# Patient Record
Sex: FEMALE | Race: WHITE | NOT HISPANIC OR LATINO | Employment: FULL TIME | URBAN - METROPOLITAN AREA
[De-identification: names, ages, dates, MRNs, and addresses within clinical notes are randomized per-mention and may not be internally consistent; named-entity substitution may affect disease eponyms.]

---

## 2019-10-03 ENCOUNTER — HOSPITAL ENCOUNTER (OUTPATIENT)
Dept: RADIOLOGY | Facility: MEDICAL CENTER | Age: 19
End: 2019-10-03
Attending: PHYSICIAN ASSISTANT
Payer: COMMERCIAL

## 2019-10-03 DIAGNOSIS — S52.572A OTHER INTRAARTICULAR FRACTURE OF LOWER END OF LEFT RADIUS, INITIAL ENCOUNTER FOR CLOSED FRACTURE: ICD-10-CM

## 2019-10-03 PROCEDURE — 73200 CT UPPER EXTREMITY W/O DYE: CPT | Mod: LT

## 2020-02-20 ENCOUNTER — OFFICE VISIT (OUTPATIENT)
Dept: URGENT CARE | Facility: CLINIC | Age: 20
End: 2020-02-20
Payer: COMMERCIAL

## 2020-02-20 VITALS
BODY MASS INDEX: 23.05 KG/M2 | TEMPERATURE: 98.6 F | SYSTOLIC BLOOD PRESSURE: 110 MMHG | HEART RATE: 80 BPM | RESPIRATION RATE: 17 BRPM | WEIGHT: 135 LBS | DIASTOLIC BLOOD PRESSURE: 68 MMHG | OXYGEN SATURATION: 100 % | HEIGHT: 64 IN

## 2020-02-20 DIAGNOSIS — J06.9 URI WITH COUGH AND CONGESTION: ICD-10-CM

## 2020-02-20 DIAGNOSIS — J40 BRONCHITIS: Primary | ICD-10-CM

## 2020-02-20 PROCEDURE — 99214 OFFICE O/P EST MOD 30 MIN: CPT | Performed by: PHYSICIAN ASSISTANT

## 2020-02-20 RX ORDER — DOXYCYCLINE HYCLATE 100 MG
100 TABLET ORAL 2 TIMES DAILY
Qty: 14 TAB | Refills: 0 | Status: SHIPPED | OUTPATIENT
Start: 2020-02-20

## 2020-02-20 RX ORDER — PROMETHAZINE HYDROCHLORIDE AND CODEINE PHOSPHATE 6.25; 1 MG/5ML; MG/5ML
5 SYRUP ORAL 4 TIMES DAILY PRN
Qty: 120 ML | Refills: 0 | Status: SHIPPED | OUTPATIENT
Start: 2020-02-20 | End: 2020-02-27

## 2020-02-20 RX ORDER — METHYLPREDNISOLONE 4 MG/1
TABLET ORAL
Qty: 21 TAB | Refills: 0 | Status: SHIPPED | OUTPATIENT
Start: 2020-02-20

## 2020-02-21 NOTE — PATIENT INSTRUCTIONS
Acute Bronchitis, Adult  Acute bronchitis is when air tubes (bronchi) in the lungs suddenly get swollen. The condition can make it hard to breathe. It can also cause these symptoms:  · A cough.  · Coughing up clear, yellow, or green mucus.  · Wheezing.  · Chest congestion.  · Shortness of breath.  · A fever.  · Body aches.  · Chills.  · A sore throat.  Follow these instructions at home:  Medicines  · Take over-the-counter and prescription medicines only as told by your doctor.  · If you were prescribed an antibiotic medicine, take it as told by your doctor. Do not stop taking the antibiotic even if you start to feel better.  General instructions  · Rest.  · Drink enough fluids to keep your pee (urine) clear or pale yellow.  · Avoid smoking and secondhand smoke. If you smoke and you need help quitting, ask your doctor. Quitting will help your lungs heal faster.  · Use an inhaler, cool mist vaporizer, or humidifier as told by your doctor.  · Keep all follow-up visits as told by your doctor. This is important.  How is this prevented?  To lower your risk of getting this condition again:  · Wash your hands often with soap and water. If you cannot use soap and water, use hand .  · Avoid contact with people who have cold symptoms.  · Try not to touch your hands to your mouth, nose, or eyes.  · Make sure to get the flu shot every year.  Contact a doctor if:  · Your symptoms do not get better in 2 weeks.  Get help right away if:  · You cough up blood.  · You have chest pain.  · You have very bad shortness of breath.  · You become dehydrated.  · You faint (pass out) or keep feeling like you are going to pass out.  · You keep throwing up (vomiting).  · You have a very bad headache.  · Your fever or chills gets worse.  This information is not intended to replace advice given to you by your health care provider. Make sure you discuss any questions you have with your health care provider.  Document Released: 06/05/2009  Document Revised: 07/26/2017 Document Reviewed: 06/07/2017  [employer] Interactive Patient Education © 2017 [employer]

## 2020-02-21 NOTE — PROGRESS NOTES
Subjective:      Pt is a 19 y.o. female who presents with Cough (dry cough , chest congestion , SOB , wheezing x 4 days )            HPI  This is a new problem. PT presents to  clinic today complaining of sore throat,  pressure in ears, cough, fatigue, runny nose, wheezing and SOB. PT denies CP, NVD, abdominal pain, joint pain. PT states these symptoms began around 4-5 days ago. PT states the pain is a 7/10 with coughing fits, aching in nature and worse at night. Pt has not taken any RX medications for this condition. The pt's medication list, problem list, and allergies have been evaluated and reviewed during today's visit.    PMH:  Past Medical History:   Diagnosis Date   • History of kidney disease 10/10/2016       PSH:  Past Surgical History:   Procedure Laterality Date   • ABDOMINAL EXPLORATION         Fam Hx:    family history includes Arthritis in her maternal grandmother; Cancer in her paternal grandfather.  Family Status   Relation Name Status   • Mo  Alive   • Fa  Alive   • Sis  Alive   • Bro     • MAunt  Alive   • MUnc  Alive   • PAunt  Alive   • PUnc  Alive   • MGMo  Alive   • MGFa  Alive   • PGMo  Alive   • PGFa  Alive       Soc HX:  Single, nonsmoker    Medications:    Current Outpatient Medications:   •  doxycycline (VIBRAMYCIN) 100 MG Tab, Take 1 Tab by mouth 2 times a day., Disp: 14 Tab, Rfl: 0  •  methylPREDNISolone (MEDROL DOSEPAK) 4 MG Tablet Therapy Pack, Follow schedule on package instructions., Disp: 21 Tab, Rfl: 0  •  promethazine-codeine (PHENERGAN-CODEINE) 6.25-10 MG/5ML Syrup, Take 5 mL by mouth 4 times a day as needed for Cough for up to 7 days., Disp: 120 mL, Rfl: 0      Allergies:  Sulfa drugs    ROS  Review of Systems   Constitutional: Positive for malaise/fatigue. Negative for fever and diaphoresis.   HENT: Positive for congestion and sore throat. Negative for ear discharge, hearing loss, nosebleeds and tinnitus.    Eyes: Negative for blurred vision, double vision and  "photophobia.   Respiratory: Positive for cough, sputum production, shortness of breath and wheezing. Negative for hemoptysis.    Cardiovascular: Negative for chest pain and palpitations.   Gastrointestinal: Negative for nausea, vomiting, abdominal pain, diarrhea and constipation.   Genitourinary: Negative for dysuria and flank pain.   Musculoskeletal: Negative for joint pain and myalgias.   Skin: Negative for itching and rash.   Neurological:  Negative for dizziness, tingling and weakness.   Endo/Heme/Allergies: Does not bruise/bleed easily.   Psychiatric/Behavioral: Negative for depression. The patient is not nervous/anxious.             Objective:     /68 (BP Location: Left arm, Patient Position: Sitting, BP Cuff Size: Adult)   Pulse 80   Temp 37 °C (98.6 °F) (Temporal)   Resp 17   Ht 1.626 m (5' 4\")   Wt 61.2 kg (135 lb)   SpO2 100%   BMI 23.17 kg/m²      Physical Exam       Physical Exam   Constitutional: PT is oriented to person, place, and time. PT appears well-developed and well-nourished. No distress.   HENT:   Head: Normocephalic and atraumatic.   Right Ear: Hearing, tympanic membrane, external ear and ear canal normal.   Left Ear: Hearing, tympanic membrane, external ear and ear canal normal.   Nose: Mucosal edema, rhinorrhea and sinus tenderness present. Right sinus exhibits frontal sinus tenderness. Left sinus exhibits frontal sinus tenderness.   Mouth/Throat: Uvula is midline. Mucous membranes are pale. Posterior oropharyngeal edema and posterior oropharyngeal erythema present. No oropharyngeal exudate.   Eyes: Conjunctivae normal and EOM are normal. Pupils are equal, round, and reactive to light. Right eye exhibits no discharge. Left eye exhibits no discharge.   Neck: Normal range of motion. Neck supple. No thyromegaly present.   Cardiovascular: Normal rate, regular rhythm, normal heart sounds and intact distal pulses.  Exam reveals no gallop and no friction rub.    No murmur " heard.  Pulmonary/Chest: Effort normal. No respiratory distress. PT has wheezes. PT has no rales. PT exhibits tenderness.   Abdominal: Soft. Bowel sounds are normal. PT exhibits no distension and no mass. There is no tenderness. There is no rebound and no guarding.   Musculoskeletal: Normal range of motion. PT exhibits no edema and no tenderness.   Lymphadenopathy:     PT has no cervical adenopathy.   Neurological: Pt is alert and oriented to person, place, and time. Pt has normal reflexes. No cranial nerve deficit.   Skin: Skin is warm and dry. No rash noted. No erythema.   Psychiatric: PT has a normal mood and affect. Pt behavior is normal. Judgment and thought content normal.        Assessment/Plan:       1. Bronchitis    - doxycycline (VIBRAMYCIN) 100 MG Tab; Take 1 Tab by mouth 2 times a day.  Dispense: 14 Tab; Refill: 0  - methylPREDNISolone (MEDROL DOSEPAK) 4 MG Tablet Therapy Pack; Follow schedule on package instructions.  Dispense: 21 Tab; Refill: 0    2. URI with cough and congestion    - methylPREDNISolone (MEDROL DOSEPAK) 4 MG Tablet Therapy Pack; Follow schedule on package instructions.  Dispense: 21 Tab; Refill: 0  - promethazine-codeine (PHENERGAN-CODEINE) 6.25-10 MG/5ML Syrup; Take 5 mL by mouth 4 times a day as needed for Cough for up to 7 days.  Dispense: 120 mL; Refill: 0      Concern for worsening symptoms of URI which shortly could transition to pneumonia and worsening sinus congestion and infection with powerful cough keeping pt up at night as they must sleep upright to avoid coughing fits.  Diff DX: Bronchitis, Sinusitis, Pneumonia, Influenza, Viral URI, Allergies  Rest, fluids encouraged.  OTC decongestant for congestion/cough  AVS with medical info given.  Pt was in full understanding and agreement with the plan.  Differential diagnosis, natural history, supportive care, and indications for immediate follow-up discussed. All questions answered. Patient agrees with the plan of  care.  Follow-up as needed if symptoms worsen or fail to improve to PCP, Urgent care or Emergency Room.

## 2021-09-16 ENCOUNTER — HOSPITAL ENCOUNTER (EMERGENCY)
Facility: MEDICAL CENTER | Age: 21
End: 2021-09-16
Attending: EMERGENCY MEDICINE
Payer: COMMERCIAL

## 2021-09-16 VITALS
RESPIRATION RATE: 16 BRPM | SYSTOLIC BLOOD PRESSURE: 114 MMHG | BODY MASS INDEX: 21.93 KG/M2 | DIASTOLIC BLOOD PRESSURE: 72 MMHG | TEMPERATURE: 97.6 F | HEIGHT: 65 IN | WEIGHT: 131.61 LBS | HEART RATE: 79 BPM | OXYGEN SATURATION: 89 %

## 2021-09-16 DIAGNOSIS — R10.9 RIGHT SIDED ABDOMINAL PAIN: ICD-10-CM

## 2021-09-16 LAB
ALBUMIN SERPL BCP-MCNC: 4.7 G/DL (ref 3.2–4.9)
ALBUMIN/GLOB SERPL: 1.7 G/DL
ALP SERPL-CCNC: 50 U/L (ref 30–99)
ALT SERPL-CCNC: 8 U/L (ref 2–50)
ANION GAP SERPL CALC-SCNC: 14 MMOL/L (ref 7–16)
APPEARANCE UR: CLEAR
AST SERPL-CCNC: 19 U/L (ref 12–45)
BACTERIA #/AREA URNS HPF: NEGATIVE /HPF
BASOPHILS # BLD AUTO: 0.5 % (ref 0–1.8)
BASOPHILS # BLD: 0.03 K/UL (ref 0–0.12)
BILIRUB SERPL-MCNC: 0.6 MG/DL (ref 0.1–1.5)
BILIRUB UR QL STRIP.AUTO: NEGATIVE
BUN SERPL-MCNC: 12 MG/DL (ref 8–22)
CALCIUM SERPL-MCNC: 9.7 MG/DL (ref 8.5–10.5)
CHLORIDE SERPL-SCNC: 105 MMOL/L (ref 96–112)
CO2 SERPL-SCNC: 22 MMOL/L (ref 20–33)
COLOR UR: YELLOW
CREAT SERPL-MCNC: 0.67 MG/DL (ref 0.5–1.4)
EOSINOPHIL # BLD AUTO: 0.03 K/UL (ref 0–0.51)
EOSINOPHIL NFR BLD: 0.5 % (ref 0–6.9)
EPI CELLS #/AREA URNS HPF: NEGATIVE /HPF
ERYTHROCYTE [DISTWIDTH] IN BLOOD BY AUTOMATED COUNT: 43.5 FL (ref 35.9–50)
GLOBULIN SER CALC-MCNC: 2.7 G/DL (ref 1.9–3.5)
GLUCOSE SERPL-MCNC: 91 MG/DL (ref 65–99)
GLUCOSE UR STRIP.AUTO-MCNC: NEGATIVE MG/DL
HCG SERPL QL: NEGATIVE
HCT VFR BLD AUTO: 46 % (ref 37–47)
HGB BLD-MCNC: 15.4 G/DL (ref 12–16)
HYALINE CASTS #/AREA URNS LPF: NORMAL /LPF
IMM GRANULOCYTES # BLD AUTO: 0.02 K/UL (ref 0–0.11)
IMM GRANULOCYTES NFR BLD AUTO: 0.3 % (ref 0–0.9)
KETONES UR STRIP.AUTO-MCNC: NEGATIVE MG/DL
LEUKOCYTE ESTERASE UR QL STRIP.AUTO: NEGATIVE
LYMPHOCYTES # BLD AUTO: 1.29 K/UL (ref 1–4.8)
LYMPHOCYTES NFR BLD: 20.8 % (ref 22–41)
MCH RBC QN AUTO: 27.8 PG (ref 27–33)
MCHC RBC AUTO-ENTMCNC: 33.5 G/DL (ref 33.6–35)
MCV RBC AUTO: 83.2 FL (ref 81.4–97.8)
MICRO URNS: ABNORMAL
MONOCYTES # BLD AUTO: 0.25 K/UL (ref 0–0.85)
MONOCYTES NFR BLD AUTO: 4 % (ref 0–13.4)
NEUTROPHILS # BLD AUTO: 4.57 K/UL (ref 2–7.15)
NEUTROPHILS NFR BLD: 73.9 % (ref 44–72)
NITRITE UR QL STRIP.AUTO: NEGATIVE
NRBC # BLD AUTO: 0 K/UL
NRBC BLD-RTO: 0 /100 WBC
PH UR STRIP.AUTO: 5.5 [PH] (ref 5–8)
PLATELET # BLD AUTO: 250 K/UL (ref 164–446)
PMV BLD AUTO: 11.3 FL (ref 9–12.9)
POTASSIUM SERPL-SCNC: 4 MMOL/L (ref 3.6–5.5)
PROT SERPL-MCNC: 7.4 G/DL (ref 6–8.2)
PROT UR QL STRIP: 30 MG/DL
RBC # BLD AUTO: 5.53 M/UL (ref 4.2–5.4)
RBC # URNS HPF: NORMAL /HPF
RBC UR QL AUTO: NEGATIVE
SODIUM SERPL-SCNC: 141 MMOL/L (ref 135–145)
SP GR UR STRIP.AUTO: 1.02
UROBILINOGEN UR STRIP.AUTO-MCNC: 0.2 MG/DL
WBC # BLD AUTO: 6.2 K/UL (ref 4.8–10.8)
WBC #/AREA URNS HPF: NORMAL /HPF

## 2021-09-16 PROCEDURE — 81001 URINALYSIS AUTO W/SCOPE: CPT

## 2021-09-16 PROCEDURE — 80053 COMPREHEN METABOLIC PANEL: CPT

## 2021-09-16 PROCEDURE — 99284 EMERGENCY DEPT VISIT MOD MDM: CPT

## 2021-09-16 PROCEDURE — 84703 CHORIONIC GONADOTROPIN ASSAY: CPT

## 2021-09-16 PROCEDURE — 85025 COMPLETE CBC W/AUTO DIFF WBC: CPT

## 2021-09-16 ASSESSMENT — PAIN DESCRIPTION - DESCRIPTORS: DESCRIPTORS: CRAMPING

## 2021-09-16 NOTE — ED NOTES
Discharge teaching and paperwork provided regarding abdominal pain and all questions/concerns answered. VSS. Patient discharged to the care of self/significant other and ambulated out of the ED.

## 2021-09-16 NOTE — ED PROVIDER NOTES
"ED Provider Note    CHIEF COMPLAINT  Chief Complaint   Patient presents with   • Abdominal Pain     onset this am   • Nausea     denies vomiting       HPI  Svetlana Diaz is a 20 y.o. female who presents to the emergency department through triage with her significant other for abdominal pain.  Patient states she was out for a run this morning, which is not unusual for her, when she developed severe right mid lower quadrant abdominal pain.  It radiated inferiorly towards the groin and upper leg and around to the flank.  10 out of 10, sharp, constant.  She felt nauseous but not vomit.  She did have one episode of diarrhea prior to her run this morning.  No difficulty urinating, dysuria, hematuria or frequency.  LMP the end of August, denies pregnancy.  Denies abnormal vaginal discharge, denies concern for STD.    Patient was not able to continue running, called her boyfriend to come pick her up.  However in route to the hospital, pain has mostly resolved after taking Motrin.  Denies sick contacts.  Denies history of similar symptoms.    REVIEW OF SYSTEMS  See HPI for further details. All other systems are negative.     PAST MEDICAL HISTORY   has a past medical history of History of kidney disease (10/10/2016).    SOCIAL HISTORY  Social History     Tobacco Use   • Smoking status: Never Smoker   • Smokeless tobacco: Never Used   Vaping Use   • Vaping Use: Never used   Substance and Sexual Activity   • Alcohol use: No   • Drug use: No   • Sexual activity: Never       SURGICAL HISTORY   has a past surgical history that includes abdominal exploration.    CURRENT MEDICATIONS  Home Medications    **Home medications have not yet been reviewed for this encounter**         ALLERGIES  Allergies   Allergen Reactions   • Sulfa Drugs        PHYSICAL EXAM  VITAL SIGNS: /67   Pulse 65   Temp 36.4 °C (97.6 °F) (Temporal)   Resp 17   Ht 1.651 m (5' 5\")   Wt 59.7 kg (131 lb 9.8 oz)   SpO2 100%   BMI 21.90 kg/m²   Pulse " ox interpretation: I interpret this pulse ox as normal.  Constitutional: Alert in no apparent distress.  HENT: Normocephalic, atraumatic. Bilateral external ears normal, Nose normal.   Eyes: Pupils are equal and reactive, Conjunctiva normal.   Neck: Normal range of motion, Supple  Lymphatic: No lymphadenopathy noted.   Cardiovascular: Regular rate and rhythm, no murmurs. Distal pulses intact.    Thorax & Lungs: Normal breath sounds.  No wheezing/rales/ronchi. No increased work of breathing  Abdomen: Soft, non-distended, non-tender to palpation. No palpable or pulsatile masses. No peritoneal signs. No CVA tenderness to percussion.  No inguinal mass or hernia.  Skin: Warm, Dry  Musculoskeletal: Good range of motion in all major joints.  No reproducible discomfort with palpation of the lumbar musculature.  Neurologic: Alert , and orient x4.  Speech clear and cohesive.  Ambulates independently.  Psychiatric: Affect normal, Judgment normal, Mood normal.       DIAGNOSTIC STUDIES / PROCEDURES    LABS  Results for orders placed or performed during the hospital encounter of 09/16/21   CBC WITH DIFFERENTIAL   Result Value Ref Range    WBC 6.2 4.8 - 10.8 K/uL    RBC 5.53 (H) 4.20 - 5.40 M/uL    Hemoglobin 15.4 12.0 - 16.0 g/dL    Hematocrit 46.0 37.0 - 47.0 %    MCV 83.2 81.4 - 97.8 fL    MCH 27.8 27.0 - 33.0 pg    MCHC 33.5 (L) 33.6 - 35.0 g/dL    RDW 43.5 35.9 - 50.0 fL    Platelet Count 250 164 - 446 K/uL    MPV 11.3 9.0 - 12.9 fL    Neutrophils-Polys 73.90 (H) 44.00 - 72.00 %    Lymphocytes 20.80 (L) 22.00 - 41.00 %    Monocytes 4.00 0.00 - 13.40 %    Eosinophils 0.50 0.00 - 6.90 %    Basophils 0.50 0.00 - 1.80 %    Immature Granulocytes 0.30 0.00 - 0.90 %    Nucleated RBC 0.00 /100 WBC    Neutrophils (Absolute) 4.57 2.00 - 7.15 K/uL    Lymphs (Absolute) 1.29 1.00 - 4.80 K/uL    Monos (Absolute) 0.25 0.00 - 0.85 K/uL    Eos (Absolute) 0.03 0.00 - 0.51 K/uL    Baso (Absolute) 0.03 0.00 - 0.12 K/uL    Immature Granulocytes  (abs) 0.02 0.00 - 0.11 K/uL    NRBC (Absolute) 0.00 K/uL   URINALYSIS (UA)    Specimen: Urine   Result Value Ref Range    Color Yellow     Character Clear     Specific Gravity 1.019 <1.035    Ph 5.5 5.0 - 8.0    Glucose Negative Negative mg/dL    Ketones Negative Negative mg/dL    Protein 30 (A) Negative mg/dL    Bilirubin Negative Negative    Urobilinogen, Urine 0.2 Negative    Nitrite Negative Negative    Leukocyte Esterase Negative Negative    Occult Blood Negative Negative    Micro Urine Req Microscopic    HCG QUAL SERUM   Result Value Ref Range    Beta-Hcg Qualitative Serum Negative Negative   URINE MICROSCOPIC (W/UA)   Result Value Ref Range    WBC 0-2 /hpf    RBC 0-2 /hpf    Bacteria Negative None /hpf    Epithelial Cells Negative /hpf    Hyaline Cast 0-2 /lpf   Comp Metabolic Panel   Result Value Ref Range    Sodium 141 135 - 145 mmol/L    Potassium 4.0 3.6 - 5.5 mmol/L    Chloride 105 96 - 112 mmol/L    Co2 22 20 - 33 mmol/L    Anion Gap 14.0 7.0 - 16.0    Glucose 91 65 - 99 mg/dL    Bun 12 8 - 22 mg/dL    Creatinine 0.67 0.50 - 1.40 mg/dL    Calcium 9.7 8.5 - 10.5 mg/dL    AST(SGOT) 19 12 - 45 U/L    ALT(SGPT) 8 2 - 50 U/L    Alkaline Phosphatase 50 30 - 99 U/L    Total Bilirubin 0.6 0.1 - 1.5 mg/dL    Albumin 4.7 3.2 - 4.9 g/dL    Total Protein 7.4 6.0 - 8.2 g/dL    Globulin 2.7 1.9 - 3.5 g/dL    A-G Ratio 1.7 g/dL   ESTIMATED GFR   Result Value Ref Range    GFR If African American >60 >60 mL/min/1.73 m 2    GFR If Non African American >60 >60 mL/min/1.73 m 2       COURSE & MEDICAL DECISION MAKING  Nursing notes and vital signs were reviewed. (See chart for details)  The patients records were reviewed, history was obtained from the patient;     ED evaluation for right-sided abdominal pain, right flank pain is unrevealing.  Patient's pain is almost completely resolved prior to my evaluation.  Abdominal exam is benign.  Hemodynamically stable without fever, tachycardia, hypotension or hypoxia.  Labs are  unremarkable, no leukocytosis or bandemia.  No anemia.  No electrolyte derangement.  Urinalysis within normal limits.  Considered ureteral colic, although no hematuria, single episodic symptoms seems atypical.  Also atypical for ovarian torsion given how quickly it resolved.  No history for ovarian cyst, no ongoing pain, no indication for further evaluation at this time.  Cannot exclude intestinal colic or gas.  She did have episode of diarrhea prior to running.  Nonetheless, she remains comfortable in the emergency department without further intervention.  Will discharge home to resume activity and diet.  Return for persistent or worsening symptoms.    Patient is stable for discharge at this time, anticipatory guidance provided, close follow-up is encouraged, and strict ED return instructions have been detailed. Patient is agreeable to the disposition and plan.    Patient's blood pressure was elevated in the emergency department, and has been referred to primary care for close monitoring.      FINAL IMPRESSION  (R10.9) Right sided abdominal pain      Electronically signed by: Jessa Abraham D.O., 9/16/2021 2:51 PM      This dictation was created using voice recognition software. The accuracy of the dictation is limited to the abilities of the software. I expect there may be some errors of grammar and possibly content. The nursing notes were reviewed and certain aspects of this information were incorporated into this note.

## 2021-09-16 NOTE — ED TRIAGE NOTES
Chief Complaint   Patient presents with   • Abdominal Pain     onset this am   • Nausea     denies vomiting     Pt states most of sx resolved after taking ibuprofen and rest

## 2021-09-16 NOTE — DISCHARGE INSTRUCTIONS
Return to the emergency department in 24 hours for any ongoing abdominal pain.  Return the emergency department immediately for worsening abdominal pain, fever, vomiting, bloody stools or other new concerns.    Otherwise, follow-up with primary care early next week for reevaluation.    Diet and activity as tolerated.

## 2021-10-19 ENCOUNTER — HOSPITAL ENCOUNTER (EMERGENCY)
Facility: MEDICAL CENTER | Age: 21
End: 2021-10-20
Attending: EMERGENCY MEDICINE
Payer: COMMERCIAL
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
## 2021-10-19 PROCEDURE — 99283 EMERGENCY DEPT VISIT LOW MDM: CPT

## 2021-10-19 RX ORDER — ESCITALOPRAM OXALATE 5 MG/1
15 TABLET ORAL DAILY
COMMUNITY

## 2021-10-19 RX ORDER — FERROUS SULFATE 325(65) MG
325 TABLET ORAL DAILY
COMMUNITY

## 2021-10-19 ASSESSMENT — FIBROSIS 4 INDEX: FIB4 SCORE: 0.62

## 2021-10-19 NOTE — LETTER
"  FORM C-4:  EMPLOYEE’S CLAIM FOR COMPENSATION/ REPORT OF INITIAL TREATMENT  EMPLOYEE’S CLAIM - PROVIDE ALL INFORMATION REQUESTED   First Name Svetlana Last Name Emily Birthdate 2000  Sex female Claim Number   Home Address [address]             [place] [address]                                   Age  21 y.o. Height  1.702 m (5' 7\") Weight  58.9 kg (129 lb 13.6 oz) [facility]  [id number]  [id number]   Mailing Address [address]              Zip [address] Telephone  [phone] (home)  Primary Language Spoken   Insurer   Third Party   [employer] Employee's Occupation (Job Title) When Injury or Occupational Disease Occurred     Employer's Name [employer] Telephone [phone]    Employer Address [address]] [place] [address]   Date of Injury  10/19/2021       Hour of Injury  9:15 PM Date Employer Notified  10/19/2021 Last Day of Work after Injury or Occupational Disease  10/19/2021 Supervisor to Whom Injury Reported  Robby Barney   Address or Location of Accident (if applicable) Work [1]   What were you doing at the time of accident? (if applicable) handling a cat    How did this injury or occupational disease occur? Be specific and answer in detail. Use additional sheet if necessary)  Was getting vital rate for cat admitted to the animal ER while take a heart rate the cat bit and attacked my left arm.   If you believe that you have an occupational disease, when did you first have knowledge of the disability and it relationship to your employment? N/A Witnesses to the Accident  Dr. Nilay Hernandez   Nature of Injury or Occupational Disease  Workers' Compensation Part(s) of Body Injured or Affected  Lower Arm (L), N/A, N/A    I CERTIFY THAT THE ABOVE IS TRUE AND CORRECT TO THE BEST OF MY KNOWLEDGE AND THAT I HAVE PROVIDED THIS INFORMATION IN ORDER TO OBTAIN THE BENEFITS " OF [place] CHAPTER [place]).  I HEREBY AUTHORIZE ANY PHYSICIAN, CHIROPRACTOR, SURGEON, PRACTITIONER, OR OTHER PERSON, ANY HOSPITAL, INCLUDING [facility] OR [facility] HOSPITAL, ANY MEDICAL SERVICE ORGANIZATION, ANY INSURANCE COMPANY, OR OTHER INSTITUTION OR ORGANIZATION TO RELEASE TO EACH OTHER, ANY MEDICAL OR OTHER INFORMATION, INCLUDING BENEFITS PAID OR PAYABLE, PERTINENT TO THIS INJURY OR DISEASE, EXCEPT INFORMATION RELATIVE TO DIAGNOSIS, TREATMENT AND/OR COUNSELING FOR AIDS, PSYCHOLOGICAL CONDITIONS, ALCOHOL OR CONTROLLED SUBSTANCES, FOR WHICH I MUST GIVE SPECIFIC AUTHORIZATION.  A PHOTOSTAT OF THIS AUTHORIZATION SHALL BE AS VALID AS THE ORIGINAL.  Date 10/20/2021                    Place  [facility]                                                                   Employee’s Signature   THIS REPORT MUST BE COMPLETED AND MAILED WITHIN 3 WORKING DAYS OF TREATMENT   Place [facility], EMERGENCY DEPT                       Name of Facility [facility]   Date  10/20/2021 Diagnosis  (W55.01XA) Cat bite, initial encounter Is there evidence the injured employee was under the influence of alcohol and/or another controlled substance at the time of accident?   Hour  1:19 AM Description of Injury or Disease  Cat bite, initial encounter No   Treatment  Patient with numerous abrasions and puncture wounds from cat bite today at work.  X-ray obtained to rule out foreign body.  Tetanus vaccine updated and wounds irrigated by patient at sink.  Have you advised the patient to remain off work five days or more?         No   X-Ray Findings  Negative If Yes   From Date    To Date      From information given by the employee, together with medical evidence, can you directly connect this injury or occupational disease as job incurred? No If No, is employee capable of: Full Duty  Yes Modified  "Duty      Is additional medical care by a physician indicated? Yes  Comments:Follow-up for wound reevaluation within the week. If Modified Duty, Specify any Limitations / Restrictions       Do you know of any previous injury or disease contributing to this condition or occupational disease? No    Date 10/20/2021 Print Doctor’s Name AdinaLloyd ADRIANA certify the employer’s copy of this form was mailed on:   Address [address]  [place] [id number] [phone] INSURER’S USE ONLY   Provider’s Tax ID Number [id number]   Telephone Dept: [phone]    Doctor’s Signature e-SignLLOYD BONILLA M.D. Degree     MD      Form C-4 (rev.10/07)                                                                         BRIEF DESCRIPTION OF RIGHTS AND BENEFITS  ([place]ursuant to [place] 616[place].050)    Notice of Injury or Occupational Disease (Incident Report Form C-1): If an injury or occupational disease (OD) arises out of and in the course of employment, you must provide written notice to your employer as soon as practicable, but no later than 7 days after the accident or OD. Your employer shall maintain a sufficient supply of the required forms.    Claim for Compensation (Form C-4): If medical treatment is sought, the form C-4 is available at the place of initial treatment. A completed \"Claim for Compensation\" (Form C-4) must be filed within 90 days after an accident or OD. The treating physician or chiropractor must, within 3 working days after treatment, complete and mail to the employer, the employer's insurer and third-party , the Claim for Compensation.    Medical Treatment: If you require medical treatment for your on-the-job injury or OD, you may be required to select a physician or chiropractor from a list provided by your workers’ compensation insurer, if it has contracted with an Organization for Managed Care (MCO) or Preferred Provider Organization (PPO) or providers of health care. If your employer has not " entered into a contract with an MCO or PPO, you may select a physician or chiropractor from the Panel of Physicians and Chiropractors. Any medical costs related to your industrial injury or OD will be paid by your insurer.    Temporary Total Disability (TTD): If your doctor has certified that you are unable to work for a period of at least 5 consecutive days, or 5 cumulative days in a 20-day period, or places restrictions on you that your employer does not accommodate, you may be entitled to TTD compensation.    Temporary Partial Disability (TPD): If the wage you receive upon reemployment is less than the compensation for TTD to which you are entitled, the insurer may be required to pay you TPD compensation to make up the difference. TPD can only be paid for a maximum of 24 months.    Permanent Partial Disability (PPD): When your medical condition is stable and there is an indication of a PPD as a result of your injury or OD, within 30 days, your insurer must arrange for an evaluation by a rating physician or chiropractor to determine the degree of your PPD. The amount of your PPD award depends on the date of injury, the results of the PPD evaluation, your age and wage.    Permanent Total Disability (PTD): If you are medically certified by a treating physician or chiropractor as permanently and totally disabled and have been granted a PTD status by your insurer, you are entitled to receive monthly benefits not to exceed 66 2/3% of your average monthly wage. The amount of your PTD payments is subject to reduction if you previously received a lump-sum PPD award.    Vocational Rehabilitation Services: You may be eligible for vocational rehabilitation services if you are unable to return to the job due to a permanent physical impairment or permanent restrictions as a result of your injury or occupational disease.    Transportation and Per Edgar Reimbursement: You may be eligible for travel expenses and per edgar  associated with medical treatment.    Reopening: You may be able to reopen your claim if your condition worsens after claim closure.     Appeal Process: If you disagree with a written determination issued by the insurer or the insurer does not respond to your request, you may appeal to the Department of Administration, , by following the instructions contained in your determination letter. You must appeal the determination within 70 days from the date of the determination letter at [address], [address], [place], [place] [address], or [address], [address], [place], [place] [address]. If you disagree with the  decision, you may appeal to the Department of Administration, . You must file your appeal within 30 days from the date of the  decision letter at [address], [address], [place], [place] [address], or [address], [place], [place] [address]. If you disagree with a decision of an , you may file a petition for judicial review with the District Court. You must do so within 30 days of the Appeal Officer’s decision. You may be represented by an  at your own expense or you may contact the [facility] for possible representation.    [place]  for Injured Workers (NAIW): If you disagree with a  decision, you may request that NAIW represent you without charge at an  Hearing. For information regarding denial of benefits, you may contact the [facility] at: [address], [address], [place] [address], [phone], or [address], [address], [place] [address], [phone]    To File a Complaint with the Division: If you wish to file a complaint with the  of the Division of Industrial Relations (DIR),  please contact the Workers’ Compensation Section, [address], [address], [place], [place] [address], telephone [phone]  [phone], or [address], [address], [place], [place] [address], telephone [phone].    For assistance with Workers’ Compensation Issues: You may contact the [facility] Office for Consumer Health Assistance, [address], [address], [place], [place] [address], Toll Free 1-116.311.9599, Web site: http://[facility].[place].gov/Programs/ANDREI E-mail: [email or web address]  D-2 (rev. 10/20)              __________________________________________________________________                                    _________________            Employee Name / Signature                                                                                                                            Date

## 2021-10-19 NOTE — LETTER
"  FORM C-4:  EMPLOYEE’S CLAIM FOR COMPENSATION/ REPORT OF INITIAL TREATMENT  EMPLOYEE’S CLAIM - PROVIDE ALL INFORMATION REQUESTED   First Name Svetlana Last Name Emily Birthdate 2000  Sex female Claim Number   Home Address [address]             [place] [address]                                   Age  21 y.o. Height  1.702 m (5' 7\") Weight  58.9 kg (129 lb 13.6 oz) N  [id number]   Mailing Address [address]              Zip [address] Telephone  [phone] (home)  Primary Language Spoken   Insurer  *** Third Party   [employer] Employee's Occupation (Job Title) When Injury or Occupational Disease Occurred     Employer's Name  Telephone [phone]    Employer Address [address] Zip [address]   Date of Injury  10/19/2021       Hour of Injury  9:15 PM Date Employer Notified  10/19/2021 Last Day of Work after Injury or Occupational Disease  10/19/2021 Supervisor to Whom Injury Reported  Robby Barney   Address or Location of Accident (if applicable) Work [1]   What were you doing at the time of accident? (if applicable) handling a cat    How did this injury or occupational disease occur? Be specific and answer in detail. Use additional sheet if necessary)  Was getting vital rate for cat admitted to the animal ER while take a heart rate the cat bit and attacked my left arm.   If you believe that you have an occupational disease, when did you first have knowledge of the disability and it relationship to your employment? N/A Witnesses to the Accident  Dr. Nilay Hernandez   Nature of Injury or Occupational Disease  Workers' Compensation Part(s) of Body Injured or Affected  Lower Arm (L), N/A, N/A    I CERTIFY THAT THE ABOVE IS TRUE AND CORRECT TO THE BEST OF MY KNOWLEDGE AND THAT I HAVE PROVIDED THIS INFORMATION IN ORDER TO OBTAIN THE BENEFITS OF [place] " [place] 616[place], [place] CHAPTER 617 OF [place]).  I HEREBY AUTHORIZE ANY PHYSICIAN, CHIROPRACTOR, SURGEON, PRACTITIONER, OR OTHER PERSON, ANY HOSPITAL, INCLUDING [facility] OR [facility] HOSPITAL, ANY MEDICAL SERVICE ORGANIZATION, ANY INSURANCE COMPANY, OR OTHER INSTITUTION OR ORGANIZATION TO RELEASE TO EACH OTHER, ANY MEDICAL OR OTHER INFORMATION, INCLUDING BENEFITS PAID OR PAYABLE, PERTINENT TO THIS INJURY OR DISEASE, EXCEPT INFORMATION RELATIVE TO DIAGNOSIS, TREATMENT AND/OR COUNSELING FOR AIDS, PSYCHOLOGICAL CONDITIONS, ALCOHOL OR CONTROLLED SUBSTANCES, FOR WHICH I MUST GIVE SPECIFIC AUTHORIZATION.  A PHOTOSTAT OF THIS AUTHORIZATION SHALL BE AS VALID AS THE ORIGINAL.  Date                                      Place                                                                             Employee’s Signature   THIS REPORT MUST BE COMPLETED AND MAILED WITHIN 3 WORKING DAYS OF TREATMENT   Place [facility], EMERGENCY DEPT                       Name of Facility [facility]   Date  10/19/2021 Diagnosis  (W55.01XA) Cat bite, initial encounter Is there evidence the injured employee was under the influence of alcohol and/or another controlled substance at the time of accident?   Hour  1:00 AM Description of Injury or Disease  Cat bite, initial encounter     Treatment     Have you advised the patient to remain off work five days or more?             X-Ray Findings    If Yes   From Date    To Date      From information given by the employee, together with medical evidence, can you directly connect this injury or occupational disease as job incurred?   If No, is employee capable of: Full Duty    Modified Duty      Is additional medical care by a physician indicated?   If Modified Duty, Specify any Limitations / Restrictions       Do you know of any previous injury or disease contributing to this condition or  "occupational disease?      Date 10/20/2021 Print Doctor’s Name Damon Holden I certify the employer’s copy of this form was mailed on:   Address [address] [id number] [phone] INSURER’S USE ONLY   Provider’s Tax ID Number   Telephone Dept: [phone]    Doctor’s Signature   Degree        Form C-4 (rev.10/07)                                                                         BRIEF DESCRIPTION OF RIGHTS AND BENEFITS  ([place]ursuant to [place] 616C.050)    Notice of Injury or Occupational Disease (Incident Report Form C-1): If an injury or occupational disease (OD) arises out of and in the course of employment, you must provide written notice to your employer as soon as practicable, but no later than 7 days after the accident or OD. Your employer shall maintain a sufficient supply of the required forms.    Claim for Compensation (Form C-4): If medical treatment is sought, the form C-4 is available at the place of initial treatment. A completed \"Claim for Compensation\" (Form C-4) must be filed within 90 days after an accident or OD. The treating physician or chiropractor must, within 3 working days after treatment, complete and mail to the employer, the employer's insurer and third-party , the Claim for Compensation.    Medical Treatment: If you require medical treatment for your on-the-job injury or OD, you may be required to select a physician or chiropractor from a list provided by your workers’ compensation insurer, if it has contracted with an Organization for Managed Care (MCO) or Preferred Provider Organization (PPO) or providers of health care. If your employer has not entered into a contract with an MCO or PPO, you may select a physician or chiropractor from the Panel of Physicians and Chiropractors. Any medical costs related to your industrial injury or OD will be paid by your insurer.    Temporary Total Disability (TTD): If your doctor has certified that you are unable to " work for a period of at least 5 consecutive days, or 5 cumulative days in a 20-day period, or places restrictions on you that your employer does not accommodate, you may be entitled to TTD compensation.    Temporary Partial Disability (TPD): If the wage you receive upon reemployment is less than the compensation for TTD to which you are entitled, the insurer may be required to pay you TPD compensation to make up the difference. TPD can only be paid for a maximum of 24 months.    Permanent Partial Disability (PPD): When your medical condition is stable and there is an indication of a PPD as a result of your injury or OD, within 30 days, your insurer must arrange for an evaluation by a rating physician or chiropractor to determine the degree of your PPD. The amount of your PPD award depends on the date of injury, the results of the PPD evaluation, your age and wage.    Permanent Total Disability (PTD): If you are medically certified by a treating physician or chiropractor as permanently and totally disabled and have been granted a PTD status by your insurer, you are entitled to receive monthly benefits not to exceed 66 2/3% of your average monthly wage. The amount of your PTD payments is subject to reduction if you previously received a lump-sum PPD award.    Vocational Rehabilitation Services: You may be eligible for vocational rehabilitation services if you are unable to return to the job due to a permanent physical impairment or permanent restrictions as a result of your injury or occupational disease.    Transportation and Per Edgar Reimbursement: You may be eligible for travel expenses and per edgar associated with medical treatment.    Reopening: You may be able to reopen your claim if your condition worsens after claim closure.     Appeal Process: If you disagree with a written determination issued by the insurer or the insurer does not respond to your request, you may appeal to the Department of Administration,  , by following the instructions contained in your determination letter. You must appeal the determination within 70 days from the date of the determination letter at [address], [address], [place], [place] [address], or [address], [address], [place], [place] [address]. If you disagree with the  decision, you may appeal to the Department of Administration, . You must file your appeal within 30 days from the date of the  decision letter at [address], [address], [place], [place] [address], or [address], [address], [place], [place] [address]. If you disagree with a decision of an , you may file a petition for judicial review with the District Court. You must do so within 30 days of the Appeal Officer’s decision. You may be represented by an  at your own expense or you may contact the [facility] for possible representation.    [place]  for Injured Workers (NAIW): If you disagree with a  decision, you may request that NAIW represent you without charge at an  Hearing. For information regarding denial of benefits, you may contact the [facility] at: [address], [address], [place] [address], [phone], or [address], [place] [address], [phone]    To File a Complaint with the Division: If you wish to file a complaint with the  of the Division of Industrial Relations (DIR),  please contact the Workers’ Compensation Section, [address], [address], [place], [place] [address], telephone [phone], or [address], [address], [place], [place] [address], telephone [phone].    For assistance with Workers’ Compensation Issues: You may contact the [facility] [place] Office for Consumer Health Assistance, [address], [address], [place], [place] [address], Toll Free 1-964.379.1859, Web site:  http://[facility].[place].gov/[place]/ANDREI E-mail: [email or web address]  D-2 (rev. 10/20)              __________________________________________________________________                                    _________________            Employee Name / Signature                                                                                                                            Date

## 2021-10-19 NOTE — LETTER
"  FORM C-4:  EMPLOYEE’S CLAIM FOR COMPENSATION/ REPORT OF INITIAL TREATMENT  EMPLOYEE’S CLAIM - PROVIDE ALL INFORMATION REQUESTED   First Name Svetlana Last Name Emily Birthdate 2000  Sex female Claim Number   Home Address [address]             [place] [address]                                   Age  21 y.o. Height  1.702 m (5' 7\") Weight  58.9 kg (129 lb 13.6 oz) N  [id number]   Mailing Address [address]              [place] [address] Telephone  [phone] (home)  Primary Language Spoken   Insurer  *** Third Party   [employer] Employee's Occupation (Job Title) When Injury or Occupational Disease Occurred     Employer's Name  Telephone [phone]    Employer Address [address]] Zip [address]   Date of Injury  10/19/2021       Hour of Injury  9:15 PM Date Employer Notified  10/19/2021 Last Day of Work after Injury or Occupational Disease  10/19/2021 Supervisor to Whom Injury Reported  Robby Barney   Address or Location of Accident (if applicable) Work [1]   What were you doing at the time of accident? (if applicable) handling a cat    How did this injury or occupational disease occur? Be specific and answer in detail. Use additional sheet if necessary)  Was getting vital rate for cat admitted to the animal ER while take a heart rate the cat bit and attacked my left arm.   If you believe that you have an occupational disease, when did you first have knowledge of the disability and it relationship to your employment? N/A Witnesses to the Accident  Dr. Nilay Hernandez   Nature of Injury or Occupational Disease  Workers' Compensation Part(s) of Body Injured or Affected  Lower Arm (L), N/A, N/A    I CERTIFY THAT THE ABOVE IS TRUE AND CORRECT TO THE BEST OF MY KNOWLEDGE AND THAT I HAVE PROVIDED THIS INFORMATION IN ORDER TO OBTAIN THE BENEFITS OF [place]  I HEREBY AUTHORIZE ANY PHYSICIAN, CHIROPRACTOR, SURGEON, PRACTITIONER, OR OTHER PERSON, ANY HOSPITAL, INCLUDING [facility], ANY MEDICAL SERVICE ORGANIZATION, ANY INSURANCE COMPANY, OR OTHER INSTITUTION OR ORGANIZATION TO RELEASE TO EACH OTHER, ANY MEDICAL OR OTHER INFORMATION, INCLUDING BENEFITS PAID OR PAYABLE, PERTINENT TO THIS INJURY OR DISEASE, EXCEPT INFORMATION RELATIVE TO DIAGNOSIS, TREATMENT AND/OR COUNSELING FOR AIDS, PSYCHOLOGICAL CONDITIONS, ALCOHOL OR CONTROLLED SUBSTANCES, FOR WHICH I MUST GIVE SPECIFIC AUTHORIZATION.  A PHOTOSTAT OF THIS AUTHORIZATION SHALL BE AS VALID AS THE ORIGINAL.  Date                                      Place                                                                             Employee’s Signature   THIS REPORT MUST BE COMPLETED AND MAILED WITHIN 3 WORKING DAYS OF TREATMENT   Place [facility], EMERGENCY DEPT                       Name of Facility [facility]   Date  10/19/2021 Diagnosis  (W55.01XA) Cat bite, initial encounter Is there evidence the injured employee was under the influence of alcohol and/or another controlled substance at the time of accident?   Hour  12:55 AM Description of Injury or Disease  Cat bite, initial encounter     Treatment     Have you advised the patient to remain off work five days or more?             X-Ray Findings    If Yes   From Date    To Date      From information given by the employee, together with medical evidence, can you directly connect this injury or occupational disease as job incurred?   If No, is employee capable of: Full Duty    Modified Duty      Is additional medical care by a physician indicated?   If Modified Duty, Specify any Limitations / Restrictions       Do you know of any previous injury or disease contributing to this condition or  "occupational disease?      Date 10/20/2021 Print Doctor’s Name Damon Holden I certify the employer’s copy of this form was mailed on:   Address [address] [id number] [phone] INSURER’S USE ONLY   Provider’s Tax ID Number   Telephone Dept: [phone]    Doctor’s Signature   Degree        Form C-4 (rev.10/07)                                                                         BRIEF DESCRIPTION OF RIGHTS AND BENEFITS  ([place]ursuant to [place] 616C.050)    Notice of Injury or Occupational Disease (Incident Report Form C-1): If an injury or occupational disease (OD) arises out of and in the course of employment, you must provide written notice to your employer as soon as practicable, but no later than 7 days after the accident or OD. Your employer shall maintain a sufficient supply of the required forms.    Claim for Compensation (Form C-4): If medical treatment is sought, the form C-4 is available at the place of initial treatment. A completed \"Claim for Compensation\" (Form C-4) must be filed within 90 days after an accident or OD. The treating physician or chiropractor must, within 3 working days after treatment, complete and mail to the employer, the employer's insurer and third-party , the Claim for Compensation.    Medical Treatment: If you require medical treatment for your on-the-job injury or OD, you may be required to select a physician or chiropractor from a list provided by your workers’ compensation insurer, if it has contracted with an Organization for Managed Care (MCO) or Preferred Provider Organization (PPO) or providers of health care. If your employer has not entered into a contract with an MCO or PPO, you may select a physician or chiropractor from the Panel of Physicians and Chiropractors. Any medical costs related to your industrial injury or OD will be paid by your insurer.    Temporary Total Disability (TTD): If your doctor has certified that you are unable to " work for a period of at least 5 consecutive days, or 5 cumulative days in a 20-day period, or places restrictions on you that your employer does not accommodate, you may be entitled to TTD compensation.    Temporary Partial Disability (TPD): If the wage you receive upon reemployment is less than the compensation for TTD to which you are entitled, the insurer may be required to pay you TPD compensation to make up the difference. TPD can only be paid for a maximum of 24 months.    Permanent Partial Disability (PPD): When your medical condition is stable and there is an indication of a PPD as a result of your injury or OD, within 30 days, your insurer must arrange for an evaluation by a rating physician or chiropractor to determine the degree of your PPD. The amount of your PPD award depends on the date of injury, the results of the PPD evaluation, your age and wage.    Permanent Total Disability (PTD): If you are medically certified by a treating physician or chiropractor as permanently and totally disabled and have been granted a PTD status by your insurer, you are entitled to receive monthly benefits not to exceed 66 2/3% of your average monthly wage. The amount of your PTD payments is subject to reduction if you previously received a lump-sum PPD award.    Vocational Rehabilitation Services: You may be eligible for vocational rehabilitation services if you are unable to return to the job due to a permanent physical impairment or permanent restrictions as a result of your injury or occupational disease.    Transportation and Per Edgar Reimbursement: You may be eligible for travel expenses and per edgar associated with medical treatment.    Reopening: You may be able to reopen your claim if your condition worsens after claim closure.     Appeal Process: If you disagree with a written determination issued by the insurer or the insurer does not respond to your request, you may appeal to the Department of Administration,  , by following the instructions contained in your determination letter. You must appeal the determination within 70 days from the date of the determination letter at [address], [address], [place], [place] [address], or [address], [address], [place], [place] [address]. If you disagree with the  decision, you may appeal to the Department of Administration, . You must file your appeal within 30 days from the date of the  decision letter at [address], [address], [place], [place] [address], or [address], [address], [place], [place] [address]. If you disagree with a decision of an , you may file a petition for judicial review with the District Court. You must do so within 30 days of the Appeal Officer’s decision. You may be represented by an  at your own expense or you may contact the [facility] for possible representation.    [place]  for Injured Workers (NAIW): If you disagree with a  decision, you may request that NAIW represent you without charge at an  Hearing. For information regarding denial of benefits, you may contact the [facility] at: [address], [address], [place] [address], [phone], or [address], [place] [address], [phone]    To File a Complaint with the Division: If you wish to file a complaint with the  of the Division of Industrial Relations (DIR),  please contact the Workers’ Compensation Section, [address], [address], [place], [place] [address], telephone [phone], or [address], [address], [place], [place] [address], telephone [phone].    For assistance with Workers’ Compensation Issues: You may contact the [facility] Office for Consumer Health Assistance, [address], [address], [place], [place] [address], Toll Free 1-859.125.2718, Web site:  http://[facility].[place].gov/Maksim/ANDREI E-mail: [email or web address]  D-2 (rev. 10/20)              __________________________________________________________________                                    _________________            Employee Name / Signature                                                                                                                            Date

## 2021-10-19 NOTE — LETTER
"  FORM C-4:  EMPLOYEE’S CLAIM FOR COMPENSATION/ REPORT OF INITIAL TREATMENT  EMPLOYEE’S CLAIM - PROVIDE ALL INFORMATION REQUESTED   First Name Svetlana Last Name Emily Birthdate 2000  Sex female Claim Number   Home Address [address]             [place] [address]                                   Age  21 y.o. Height  1.702 m (5' 7\") Weight  58.9 kg (129 lb 13.6 oz) N  [id number]   Mailing Address [address]              Zip [address] Telephone  [phone] (home)  Primary Language Spoken   Insurer  *** Third Party   [employer] Employee's Occupation (Job Title) When Injury or Occupational Disease Occurred     Employer's Name  Telephone [phone]    Employer Address [address]] Zip [address]   Date of Injury  10/19/2021       Hour of Injury  9:15 PM Date Employer Notified  10/19/2021 Last Day of Work after Injury or Occupational Disease  10/19/2021 Supervisor to Whom Injury Reported  Robby Barney   Address or Location of Accident (if applicable) Work [1]   What were you doing at the time of accident? (if applicable) handling a cat    How did this injury or occupational disease occur? Be specific and answer in detail. Use additional sheet if necessary)  Was getting vital rate for cat admitted to the animal ER while take a heart rate the cat bit and attacked my left arm.   If you believe that you have an occupational disease, when did you first have knowledge of the disability and it relationship to your employment? N/A Witnesses to the Accident  Dr. Nilay Hernandez   Nature of Injury or Occupational Disease  Workers' Compensation Part(s) of Body Injured or Affected  Lower Arm (L), N/A, N/A    I CERTIFY THAT THE ABOVE IS TRUE AND CORRECT TO THE BEST OF MY KNOWLEDGE AND THAT I HAVE PROVIDED THIS INFORMATION IN ORDER TO OBTAIN THE BENEFITS OF [place], [place] CHAPTER 617 [place]).  I HEREBY AUTHORIZE ANY PHYSICIAN, CHIROPRACTOR, SURGEON, PRACTITIONER, OR OTHER PERSON, ANY HOSPITAL, INCLUDING [facility], ANY MEDICAL SERVICE ORGANIZATION, ANY INSURANCE COMPANY, OR OTHER INSTITUTION OR ORGANIZATION TO RELEASE TO EACH OTHER, ANY MEDICAL OR OTHER INFORMATION, INCLUDING BENEFITS PAID OR PAYABLE, PERTINENT TO THIS INJURY OR DISEASE, EXCEPT INFORMATION RELATIVE TO DIAGNOSIS, TREATMENT AND/OR COUNSELING FOR AIDS, PSYCHOLOGICAL CONDITIONS, ALCOHOL OR CONTROLLED SUBSTANCES, FOR WHICH I MUST GIVE SPECIFIC AUTHORIZATION.  A PHOTOSTAT OF THIS AUTHORIZATION SHALL BE AS VALID AS THE ORIGINAL.  Date                                      Place                                                                             Employee’s Signature   THIS REPORT MUST BE COMPLETED AND MAILED WITHIN 3 WORKING DAYS OF TREATMENT   Place [facility], EMERGENCY DEPT                       Name of Facility [facility]   Date  10/19/2021 Diagnosis  (W55.01XA) Cat bite, initial encounter Is there evidence the injured employee was under the influence of alcohol and/or another controlled substance at the time of accident?   Hour  12:50 AM Description of Injury or Disease  Cat bite, initial encounter     Treatment     Have you advised the patient to remain off work five days or more?             X-Ray Findings    If Yes   From Date    To Date      From information given by the employee, together with medical evidence, can you directly connect this injury or occupational disease as job incurred?   If No, is employee capable of: Full Duty    Modified Duty      Is additional medical care by a physician indicated?   If Modified Duty, Specify any Limitations / Restrictions       Do you know of any previous injury or disease contributing to this condition or  "occupational disease?      Date 10/20/2021 Print Doctor’s Name Damon Holden I certify the employer’s copy of this form was mailed on:   Address [address] [id number] [phone] INSURER’S USE ONLY   Provider’s Tax ID Number   Telephone Dept: [phone]    Doctor’s Signature   Degree        Form C-4 (rev.10/07)                                                                         BRIEF DESCRIPTION OF RIGHTS AND BENEFITS  ([place] to [place] 616C.050)    Notice of Injury or Occupational Disease (Incident Report Form C-1): If an injury or occupational disease (OD) arises out of and in the course of employment, you must provide written notice to your employer as soon as practicable, but no later than 7 days after the accident or OD. Your employer shall maintain a sufficient supply of the required forms.    Claim for Compensation (Form C-4): If medical treatment is sought, the form C-4 is available at the place of initial treatment. A completed \"Claim for Compensation\" (Form C-4) must be filed within 90 days after an accident or OD. The treating physician or chiropractor must, within 3 working days after treatment, complete and mail to the employer, the employer's insurer and third-party , the Claim for Compensation.    Medical Treatment: If you require medical treatment for your on-the-job injury or OD, you may be required to select a physician or chiropractor from a list provided by your workers’ compensation insurer, if it has contracted with an Organization for Managed Care (MCO) or Preferred Provider Organization (PPO) or providers of health care. If your employer has not entered into a contract with an MCO or PPO, you may select a physician or chiropractor from the Panel of Physicians and Chiropractors. Any medical costs related to your industrial injury or OD will be paid by your insurer.    Temporary Total Disability (TTD): If your doctor has certified that you are unable to " work for a period of at least 5 consecutive days, or 5 cumulative days in a 20-day period, or places restrictions on you that your employer does not accommodate, you may be entitled to TTD compensation.    Temporary Partial Disability (TPD): If the wage you receive upon reemployment is less than the compensation for TTD to which you are entitled, the insurer may be required to pay you TPD compensation to make up the difference. TPD can only be paid for a maximum of 24 months.    Permanent Partial Disability (PPD): When your medical condition is stable and there is an indication of a PPD as a result of your injury or OD, within 30 days, your insurer must arrange for an evaluation by a rating physician or chiropractor to determine the degree of your PPD. The amount of your PPD award depends on the date of injury, the results of the PPD evaluation, your age and wage.    Permanent Total Disability (PTD): If you are medically certified by a treating physician or chiropractor as permanently and totally disabled and have been granted a PTD status by your insurer, you are entitled to receive monthly benefits not to exceed 66 2/3% of your average monthly wage. The amount of your PTD payments is subject to reduction if you previously received a lump-sum PPD award.    Vocational Rehabilitation Services: You may be eligible for vocational rehabilitation services if you are unable to return to the job due to a permanent physical impairment or permanent restrictions as a result of your injury or occupational disease.    Transportation and Per Edgar Reimbursement: You may be eligible for travel expenses and per edgar associated with medical treatment.    Reopening: You may be able to reopen your claim if your condition worsens after claim closure.     Appeal Process: If you disagree with a written determination issued by the insurer or the insurer does not respond to your request, you may appeal to the Department of Administration,  , by following the instructions contained in your determination letter. You must appeal the determination within 70 days from the date of the determination letter at [address], [address], [place], [place] [address], or [address], [address], [place], [place] [address]. If you disagree with the  decision, you may appeal to the Department of Administration, . You must file your appeal within 30 days from the date of the  decision letter at [address], [address], [place], [place] [address], or [address], [address], [place], [place] [address]. If you disagree with a decision of an , you may file a petition for judicial review with the District Court. You must do so within 30 days of the Appeal Officer’s decision. You may be represented by an  at your own expense or you may contact the [facility] for possible representation.    [place]  for In[place] ([place]IW): If you disagree with a  decision, you may request that NAIW represent you without charge at an  Hearing. For information regarding denial of benefits, you may contact the [facility] at: [address], [address], [place] [address], [phone], or [address], [place] [address], [phone]    To File a Complaint with the Division: If you wish to file a complaint with the  of the Division of Industrial Relations (DIR),  please contact the Workers’ Compensation Section, [address], [address], [place], [place] [address], telephone [phone], or [address], [address], [place], [place] [address], telephone [phone].    For assistance with Workers’ Compensation Issues: You may contact the [facility] Office for Consumer Health Assistance, [address], [address], [place], [place] [address], Toll Free 1-687.957.4521, Web site:  http://[facility].[place].gov/Maksim/ANDREI E-mail: [email or web address]  D-2 (rev. 10/20)              __________________________________________________________________                                    _________________            Employee Name / Signature                                                                                                                            Date

## 2021-10-19 NOTE — LETTER
"  FORM C-4:  EMPLOYEE’S CLAIM FOR COMPENSATION/ REPORT OF INITIAL TREATMENT  EMPLOYEE’S CLAIM - PROVIDE ALL INFORMATION REQUESTED   First Name Svetlana Last Name Emily Birthdate 2000  Sex female Claim Number   Home Address [address] [place]             [place] [address]                                   Age  21 y.o. Height  1.702 m (5' 7\") Weight  58.9 kg (129 lb 13.6 oz) N  [id number]   Mailing Address [address], [place]              [place] [address] Telephone  [phone] (home)  Primary Language Spoken   Insurer  *** Third Party   [employer] Employee's Occupation (Job Title) When Injury or Occupational Disease Occurred     Employer's Name  Telephone [phone]    Employer Address [address]] Zip [address]   Date of Injury  10/19/2021       Hour of Injury  9:15 PM Date Employer Notified  10/19/2021 Last Day of Work after Injury or Occupational Disease  10/19/2021 Supervisor to Whom Injury Reported  Robby Barney   Address or Location of Accident (if applicable) Work [1]   What were you doing at the time of accident? (if applicable) handling a cat    How did this injury or occupational disease occur? Be specific and answer in detail. Use additional sheet if necessary)  Was getting vital rate for cat admitted to the animal ER while take a heart rate the cat bit and attacked my left arm.   If you believe that you have an occupational disease, when did you first have knowledge of the disability and it relationship to your employment? N/A Witnesses to the Accident  Dr. Nilay Hernandez   Nature of Injury or Occupational Disease  Workers' Compensation Part(s) of Body Injured or Affected  Lower Arm (L), N/A, N/A    I CERTIFY THAT THE ABOVE IS TRUE AND CORRECT TO THE BEST OF MY KNOWLEDGE AND THAT I HAVE PROVIDED THIS INFORMATION IN ORDER TO OBTAIN THE BENEF[place] OF [place]).  I HEREBY AUTHORIZE ANY PHYSICIAN, CHIROPRACTOR, SURGEON, PRACTITIONER, OR OTHER PERSON, ANY HOSPITAL, INCLUDING [facility] OR [facility] HOSPITAL, ANY MEDICAL SERVICE ORGANIZATION, ANY INSURANCE COMPANY, OR OTHER INSTITUTION OR ORGANIZATION TO RELEASE TO EACH OTHER, ANY MEDICAL OR OTHER INFORMATION, INCLUDING BENEFITS PAID OR PAYABLE, PERTINENT TO THIS INJURY OR DISEASE, EXCEPT INFORMATION RELATIVE TO DIAGNOSIS, TREATMENT AND/OR COUNSELING FOR AIDS, PSYCHOLOGICAL CONDITIONS, ALCOHOL OR CONTROLLED SUBSTANCES, FOR WHICH I MUST GIVE SPECIFIC AUTHORIZATION.  A PHOTOSTAT OF THIS AUTHORIZATION SHALL BE AS VALID AS THE ORIGINAL.  Date                                      Place                                                                             Employee’s Signature   THIS REPORT MUST BE COMPLETED AND MAILED WITHIN 3 WORKING DAYS OF TREATMENT   Place [facility], EMERGENCY DEPT                       Name of Facility [facility]   Date  10/19/2021 Diagnosis  (W55.01XA) Cat bite, initial encounter Is there evidence the injured employee was under the influence of alcohol and/or another controlled substance at the time of accident?   Hour  12:32 AM Description of Injury or Disease  Cat bite, initial encounter     Treatment     Have you advised the patient to remain off work five days or more?             X-Ray Findings    If Yes   From Date    To Date      From information given by the employee, together with medical evidence, can you directly connect this injury or occupational disease as job incurred?   If No, is employee capable of: Full Duty    Modified Duty      Is additional medical care by a physician indicated?   If Modified Duty, Specify any Limitations / Restrictions       Do you know of any previous injury or disease contributing to this condition or  "occupational disease?      Date 10/20/2021 Print Doctor’s Name Damon Holden I certify the employer’s copy of this form was mailed on:   Address [address] [id number] [phone] INSURER’S USE ONLY   Provider’s Tax ID Number   Telephone Dept: [phone]    Doctor’s Signature   Degree        Form C-4 (rev.10/07)                                                                         BRIEF DESCRIPTION OF RIGHTS AND BENEFITS  (Pursuant to [place] 616C.050)    Notice of Injury or Occupational Disease (Incident Report Form C-1): If an injury or occupational disease (OD) arises out of and in the course of employment, you must provide written notice to your employer as soon as practicable, but no later than 7 days after the accident or OD. Your employer shall maintain a sufficient supply of the required forms.    Claim for Compensation (Form C-4): If medical treatment is sought, the form C-4 is available at the place of initial treatment. A completed \"Claim for Compensation\" (Form C-4) must be filed within 90 days after an accident or OD. The treating physician or chiropractor must, within 3 working days after treatment, complete and mail to the employer, the employer's insurer and third-party , the Claim for Compensation.    Medical Treatment: If you require medical treatment for your on-the-job injury or OD, you may be required to select a physician or chiropractor from a list provided by your workers’ compensation insurer, if it has contracted with an Organization for Managed Care (MCO) or Preferred Provider Organization (PPO) or providers of health care. If your employer has not entered into a contract with an MCO or PPO, you may select a physician or chiropractor from the Panel of Physicians and Chiropractors. Any medical costs related to your industrial injury or OD will be paid by your insurer.    Temporary Total Disability (TTD): If your doctor has certified that you are unable to " work for a period of at least 5 consecutive days, or 5 cumulative days in a 20-day period, or places restrictions on you that your employer does not accommodate, you may be entitled to TTD compensation.    Temporary Partial Disability (TPD): If the wage you receive upon reemployment is less than the compensation for TTD to which you are entitled, the insurer may be required to pay you TPD compensation to make up the difference. TPD can only be paid for a maximum of 24 months.    Permanent Partial Disability (PPD): When your medical condition is stable and there is an indication of a PPD as a result of your injury or OD, within 30 days, your insurer must arrange for an evaluation by a rating physician or chiropractor to determine the degree of your PPD. The amount of your PPD award depends on the date of injury, the results of the PPD evaluation, your age and wage.    Permanent Total Disability (PTD): If you are medically certified by a treating physician or chiropractor as permanently and totally disabled and have been granted a PTD status by your insurer, you are entitled to receive monthly benefits not to exceed 66 2/3% of your average monthly wage. The amount of your PTD payments is subject to reduction if you previously received a lump-sum PPD award.    Vocational Rehabilitation Services: You may be eligible for vocational rehabilitation services if you are unable to return to the job due to a permanent physical impairment or permanent restrictions as a result of your injury or occupational disease.    Transportation and Per Edgar Reimbursement: You may be eligible for travel expenses and per edgar associated with medical treatment.    Reopening: You may be able to reopen your claim if your condition worsens after claim closure.     Appeal Process: If you disagree with a written determination issued by the insurer or the insurer does not respond to your request, you may appeal to the Department of Administration,  , by following the instructions contained in your determination letter. You must appeal the determination within 70 days from the date of the determination letter at [address], [address], [place], [place] [address], or [address], [address], [place], [place] [address]. If you disagree with the  decision, you may appeal to the Department of Administration, . You must file your appeal within 30 days from the date of the  decision letter at [address], [address], [place], [place] [address], or [address], [address], [place], [place] [address]. If you disagree with a decision of an , you may file a petition for judicial review with the District Court. You must do so within 30 days of the Appeal Officer’s decision. You may be represented by an  at your own expense or you may contact the [facility] for possible representation.    [place]  for In[place] (NAIW): If you disagree with a  decision, you may request that NAIW represent you without charge at an  Hearing. For information regarding denial of benefits, you may contact the [facility] at: [address], [address], [place] [address], [phone], or [address], [place] [address], [phone]    To File a Complaint with the Division: If you wish to file a complaint with the  of the Division of Industrial Relations (DIR),  please contact the Workers’ Compensation Section, [address], [address], [place], [place] [address], telephone [phone], or [address], [address], [place], [place] [address], telephone [phone].    For assistance with Workers’ Compensation Issues: You may contact the [facility] Office for Consumer Health Assistance, [address], [address], [place], [place] [address], Toll Free 1-768.565.9502, Web site:  http://[facility].[place].gov/Maksim/ANDREI E-mail: [email or web address].[place].gov  D-2 (rev. 10/20)              __________________________________________________________________                                    _________________            Employee Name / Signature                                                                                                                            Date

## 2021-10-19 NOTE — LETTER
"  FORM C-4:  EMPLOYEE’S CLAIM FOR COMPENSATION/ REPORT OF INITIAL TREATMENT  EMPLOYEE’S CLAIM - PROVIDE ALL INFORMATION REQUESTED   First Name Svetlana Last Name Emily Birthdate 2000  Sex female Claim Number   Home Address [address]             Zip [address]                                   Age  21 y.o. Height  1.702 m (5' 7\") Weight  58.9 kg (129 lb 13.6 oz) N  [id number]   Mailing Address [address]              [place] [address] Telephone  [phone] (home)  Primary Language Spoken   Insurer  *** Third Party   [employer] Employee's Occupation (Job Title) When Injury or Occupational Disease Occurred     Employer's Name  Telephone [phone]    Employer Address [address]] Zip [address]   Date of Injury  10/19/2021       Hour of Injury  9:15 PM Date Employer Notified  10/19/2021 Last Day of Work after Injury or Occupational Disease  10/19/2021 Supervisor to Whom Injury Reported  Robby Barney   Address or Location of Accident (if applicable) Work [1]   What were you doing at the time of accident? (if applicable) handling a cat    How did this injury or occupational disease occur? Be specific and answer in detail. Use additional sheet if necessary)  Was getting vital rate for cat admitted to the animal ER while take a heart rate the cat bit and attacked my left arm.   If you believe that you have an occupational disease, when did you first have knowledge of the disability and it relationship to your employment? N/A Witnesses to the Accident  Dr. Nilay Hernandez   Nature of Injury or Occupational Disease  Workers' Compensation Part(s) of Body Injured or Affected  Lower Arm (L), N/A, N/A    I CERTIFY THAT THE ABOVE IS TRUE AND CORRECT TO THE BEST OF MY KNOWLEDGE AND THAT I HAVE PROVIDED THIS INFORMATION IN ORDER TO OBTAIN THE BENEFITS [place], [place] 617 [place]  I HEREBY AUTHORIZE ANY PHYSICIAN, CHIROPRACTOR, SURGEON, PRACTITIONER, OR OTHER PERSON, ANY HOSPITAL, INCLUDING [facility] OR [facility] HOSPITAL, ANY MEDICAL SERVICE ORGANIZATION, ANY INSURANCE COMPANY, OR OTHER INSTITUTION OR ORGANIZATION TO RELEASE TO EACH OTHER, ANY MEDICAL OR OTHER INFORMATION, INCLUDING BENEFITS PAID OR PAYABLE, PERTINENT TO THIS INJURY OR DISEASE, EXCEPT INFORMATION RELATIVE TO DIAGNOSIS, TREATMENT AND/OR COUNSELING FOR AIDS, PSYCHOLOGICAL CONDITIONS, ALCOHOL OR CONTROLLED SUBSTANCES, FOR WHICH I MUST GIVE SPECIFIC AUTHORIZATION.  A PHOTOSTAT OF THIS AUTHORIZATION SHALL BE AS VALID AS THE ORIGINAL.  Date                                      Place                                                                             Employee’s Signature   THIS REPORT MUST BE COMPLETED AND MAILED WITHIN 3 WORKING DAYS OF TREATMENT   Place [facility], EMERGENCY DEPT                       Name of Facility [facility]   Date  10/19/2021 Diagnosis  (W55.01XA) Cat bite, initial encounter Is there evidence the injured employee was under the influence of alcohol and/or another controlled substance at the time of accident?   Hour  1:10 AM Description of Injury or Disease  Cat bite, initial encounter     Treatment     Have you advised the patient to remain off work five days or more?             X-Ray Findings    If Yes   From Date    To Date      From information given by the employee, together with medical evidence, can you directly connect this injury or occupational disease as job incurred?   If No, is employee capable of: Full Duty    Modified Duty      Is additional medical care by a physician indicated?   If Modified Duty, Specify any Limitations / Restrictions       Do you know of any previous injury or disease contributing to this condition or  "occupational disease?      Date 10/20/2021 Print Doctor’s Name Damon Holden I certify the employer’s copy of this form was mailed on:   Address [address] [id number] [phone] INSURER’S USE ONLY   Provider’s Tax ID Number   Telephone Dept: [phone]    Doctor’s Signature   Degree        Form C-4 (rev.10/07)                                                                         BRIEF DESCRIPTION OF RIGHTS AND BENEFITS  ([place] to [place] 616C.050)    Notice of Injury or Occupational Disease (Incident Report Form C-1): If an injury or occupational disease (OD) arises out of and in the course of employment, you must provide written notice to your employer as soon as practicable, but no later than 7 days after the accident or OD. Your employer shall maintain a sufficient supply of the required forms.    Claim for Compensation (Form C-4): If medical treatment is sought, the form C-4 is available at the place of initial treatment. A completed \"Claim for Compensation\" (Form C-4) must be filed within 90 days after an accident or OD. The treating physician or chiropractor must, within 3 working days after treatment, complete and mail to the employer, the employer's insurer and third-party , the Claim for Compensation.    Medical Treatment: If you require medical treatment for your on-the-job injury or OD, you may be required to select a physician or chiropractor from a list provided by your workers’ compensation insurer, if it has contracted with an Organization for Managed Care (MCO) or Preferred Provider Organization (PPO) or providers of health care. If your employer has not entered into a contract with an MCO or PPO, you may select a physician or chiropractor from the Panel of Physicians and Chiropractors. Any medical costs related to your industrial injury or OD will be paid by your insurer.    Temporary Total Disability (TTD): If your doctor has certified that you are unable to " work for a period of at least 5 consecutive days, or 5 cumulative days in a 20-day period, or places restrictions on you that your employer does not accommodate, you may be entitled to TTD compensation.    Temporary Partial Disability (TPD): If the wage you receive upon reemployment is less than the compensation for TTD to which you are entitled, the insurer may be required to pay you TPD compensation to make up the difference. TPD can only be paid for a maximum of 24 months.    Permanent Partial Disability (PPD): When your medical condition is stable and there is an indication of a PPD as a result of your injury or OD, within 30 days, your insurer must arrange for an evaluation by a rating physician or chiropractor to determine the degree of your PPD. The amount of your PPD award depends on the date of injury, the results of the PPD evaluation, your age and wage.    Permanent Total Disability (PTD): If you are medically certified by a treating physician or chiropractor as permanently and totally disabled and have been granted a PTD status by your insurer, you are entitled to receive monthly benefits not to exceed 66 2/3% of your average monthly wage. The amount of your PTD payments is subject to reduction if you previously received a lump-sum PPD award.    Vocational Rehabilitation Services: You may be eligible for vocational rehabilitation services if you are unable to return to the job due to a permanent physical impairment or permanent restrictions as a result of your injury or occupational disease.    Transportation and Per Edgar Reimbursement: You may be eligible for travel expenses and per edgar associated with medical treatment.    Reopening: You may be able to reopen your claim if your condition worsens after claim closure.     Appeal Process: If you disagree with a written determination issued by the insurer or the insurer does not respond to your request, you may appeal to the Department of Administration,  , by following the instructions contained in your determination letter. You must appeal the determination within 70 days from the date of the determination letter at [address], [address], [place], [place] [address], or [address], [place], [place] [address]. If you disagree with the  decision, you may appeal to the Department of Administration, . You must file your appeal within 30 days from the date of the  decision letter at [address], [address], [place], [place] [address], or [address], [address], [place], [place] [address]. If you disagree with a decision of an , you may file a petition for judicial review with the District Court. You must do so within 30 days of the Appeal Officer’s decision. You may be represented by an  at your own expense or you may contact the [facility] for possible representation.    [place]  for [place] ([place]): If you disagree with a  decision, you may request that NAIW represent you without charge at an  Hearing. For information regarding denial of benefits, you may contact the [facility] at: [address], [address], [place] [address], [phone], or [address], [place] [address], [phone]    To File a Complaint with the Division: If you wish to file a complaint with the  of the Division of Industrial Relations (DIR),  please contact the Workers’ Compensation Section, [address], [address], [place], [place] [address], telephone (045) [phone], or [address], [place], [place] [address], telephone [phone].    For assistance with Workers’ Compensation Issues: You may contact the [facility] Office for Consumer Health Assistance, [address], [place], [place] [address], Toll Free 1-652.878.1676, Web site:  http://[facility].[place].gov/Maksim/ANDREI E-mail: [email or web address]  D-2 (rev. 10/20)              __________________________________________________________________                                    _________________            Employee Name / Signature                                                                                                                            Date

## 2021-10-19 NOTE — LETTER
"  FORM C-4:  EMPLOYEE’S CLAIM FOR COMPENSATION/ REPORT OF INITIAL TREATMENT  EMPLOYEE’S CLAIM - PROVIDE ALL INFORMATION REQUESTED   First Name Svetlana Last Name Emily Birthdate 2000  Sex female Claim Number   Home Address [address]             Zip [address]                                   Age  21 y.o. Height  1.702 m (5' 7\") Weight  58.9 kg (129 lb 13.6 oz) N  [id number]   Mailing Address [address]              Zip [address] Telephone  [phone] (home)  Primary Language Spoken   Insurer  *** Third Party   [employer] Employee's Occupation (Job Title) When Injury or Occupational Disease Occurred     Employer's Name  Telephone [phone]    Employer Address [address]] Zip [address]   Date of Injury  10/19/2021       Hour of Injury  9:15 PM Date Employer Notified  10/19/2021 Last Day of Work after Injury or Occupational Disease  10/19/2021 Supervisor to Whom Injury Reported  Robby Barney   Address or Location of Accident (if applicable) Work [1]   What were you doing at the time of accident? (if applicable) handling a cat    How did this injury or occupational disease occur? Be specific and answer in detail. Use additional sheet if necessary)  Was getting vital rate for cat admitted to the animal ER while take a heart rate the cat bit and attacked my left arm.   If you believe that you have an occupational disease, when did you first have knowledge of the disability and it relationship to your employment? N/A Witnesses to the Accident  Dr. Nilay Hernandez   Nature of Injury or Occupational Disease  Workers' Compensation Part(s) of Body Injured or Affected  Lower Arm (L), N/A, N/A    I CERTIFY THAT THE ABOVE IS TRUE AND CORRECT TO THE BEST OF MY KNOWLEDGE AND THAT I HAVE PROVIDED THIS INFORMATION IN ORDER TO OBTAIN THE BENEFITS OF [place]).  I HEREBY AUTHORIZE ANY PHYSICIAN, CHIROPRACTOR, SURGEON, PRACTITIONER, OR OTHER PERSON, ANY HOSPITAL, INCLUDING [facility] OR [facility], ANY MEDICAL SERVICE ORGANIZATION, ANY INSURANCE COMPANY, OR OTHER INSTITUTION OR ORGANIZATION TO RELEASE TO EACH OTHER, ANY MEDICAL OR OTHER INFORMATION, INCLUDING BENEFITS PAID OR PAYABLE, PERTINENT TO THIS INJURY OR DISEASE, EXCEPT INFORMATION RELATIVE TO DIAGNOSIS, TREATMENT AND/OR COUNSELING FOR AIDS, PSYCHOLOGICAL CONDITIONS, ALCOHOL OR CONTROLLED SUBSTANCES, FOR WHICH I MUST GIVE SPECIFIC AUTHORIZATION.  A PHOTOSTAT OF THIS AUTHORIZATION SHALL BE AS VALID AS THE ORIGINAL.  Date                                      Place                                                                             Employee’s Signature   THIS REPORT MUST BE COMPLETED AND MAILED WITHIN 3 WORKING DAYS OF TREATMENT   Place [facility], EMERGENCY DEPT                       Name of Facility [facility]   Date  10/19/2021 Diagnosis  (W55.01XA) Cat bite, initial encounter Is there evidence the injured employee was under the influence of alcohol and/or another controlled substance at the time of accident?   Hour  1:14 AM Description of Injury or Disease  Cat bite, initial encounter     Treatment     Have you advised the patient to remain off work five days or more?             X-Ray Findings    If Yes   From Date    To Date      From information given by the employee, together with medical evidence, can you directly connect this injury or occupational disease as job incurred?   If No, is employee capable of: Full Duty    Modified Duty      Is additional medical care by a physician indicated?   If Modified Duty, Specify any Limitations / Restrictions       Do you know of any previous injury or disease contributing to this condition or  "occupational disease?      Date 10/20/2021 Print Doctor’s Name Damon Holden I certify the employer’s copy of this form was mailed on:   Address [address] [id number] [phone] INSURER’S USE ONLY   Provider’s Tax ID Number   Telephone Dept: [phone]    Doctor’s Signature   Degree        Form C-4 (rev.10/07)                                                                         BRIEF DESCRIPTION OF RIGHTS AND BENEFITS  (Pursuant to [place] 616C.050)    Notice of Injury or Occupational Disease (Incident Report Form C-1): If an injury or occupational disease (OD) arises out of and in the course of employment, you must provide written notice to your employer as soon as practicable, but no later than 7 days after the accident or OD. Your employer shall maintain a sufficient supply of the required forms.    Claim for Compensation (Form C-4): If medical treatment is sought, the form C-4 is available at the place of initial treatment. A completed \"Claim for Compensation\" (Form C-4) must be filed within 90 days after an accident or OD. The treating physician or chiropractor must, within 3 working days after treatment, complete and mail to the employer, the employer's insurer and third-party , the Claim for Compensation.    Medical Treatment: If you require medical treatment for your on-the-job injury or OD, you may be required to select a physician or chiropractor from a list provided by your workers’ compensation insurer, if it has contracted with an Organization for Managed Care (MCO) or Preferred Provider Organization (PPO) or providers of health care. If your employer has not entered into a contract with an MCO or PPO, you may select a physician or chiropractor from the Panel of Physicians and Chiropractors. Any medical costs related to your industrial injury or OD will be paid by your insurer.    Temporary Total Disability (TTD): If your doctor has certified that you are unable to " work for a period of at least 5 consecutive days, or 5 cumulative days in a 20-day period, or places restrictions on you that your employer does not accommodate, you may be entitled to TTD compensation.    Temporary Partial Disability (TPD): If the wage you receive upon reemployment is less than the compensation for TTD to which you are entitled, the insurer may be required to pay you TPD compensation to make up the difference. TPD can only be paid for a maximum of 24 months.    Permanent Partial Disability (PPD): When your medical condition is stable and there is an indication of a PPD as a result of your injury or OD, within 30 days, your insurer must arrange for an evaluation by a rating physician or chiropractor to determine the degree of your PPD. The amount of your PPD award depends on the date of injury, the results of the PPD evaluation, your age and wage.    Permanent Total Disability (PTD): If you are medically certified by a treating physician or chiropractor as permanently and totally disabled and have been granted a PTD status by your insurer, you are entitled to receive monthly benefits not to exceed 66 2/3% of your average monthly wage. The amount of your PTD payments is subject to reduction if you previously received a lump-sum PPD award.    Vocational Rehabilitation Services: You may be eligible for vocational rehabilitation services if you are unable to return to the job due to a permanent physical impairment or permanent restrictions as a result of your injury or occupational disease.    Transportation and Per Edgar Reimbursement: You may be eligible for travel expenses and per edgar associated with medical treatment.    Reopening: You may be able to reopen your claim if your condition worsens after claim closure.     Appeal Process: If you disagree with a written determination issued by the insurer or the insurer does not respond to your request, you may appeal to the Department of Administration,  , by following the instructions contained in your determination letter. You must appeal the determination within 70 days from the date of the determination letter at [address], [address], [place], [place] [address], or [address], [address], [place], [place] [address]. If you disagree with the  decision, you may appeal to the Department of Administration, . You must file your appeal within 30 days from the date of the  decision letter at [address], [address], [place], [place] [address], or [address], [address], [place], [place] [address]. If you disagree with a decision of an , you may file a petition for judicial review with the District Court. You must do so within 30 days of the Appeal Officer’s decision. You may be represented by an  at your own expense or you may contact the [facility] for possible representation.    [place]  for Injured Workers (NAIW): If you disagree with a  decision, you may request that NAIW represent you without charge at an  Hearing. For information regarding denial of benefits, you may contact the [facility] at: [address], [address], [place] [address], [phone], or [address], [address], [place] [address], [phone]    To File a Complaint with the Division: If you wish to file a complaint with the  of the Division of Industrial Relations (DIR),  please contact the Workers’ Compensation Section, [address], [address], [place], [place] [address], telephone [phone], or [address], [address], [place], [place] [address], telephone [phone].    For assistance with Workers’ Compensation Issues: You may contact the [facility] Office for Consumer Health Assistance, [address], [address], [place], [place] [address], Toll Free 1-816.360.6630, Web site:  http://[facility].[place].gov/Maksim/ANDREI E-mail: andrei@[facility].nv.gov  D-2 (rev. 10/20)              __________________________________________________________________                                    _________________            Employee Name / Signature                                                                                                                            Date

## 2021-10-20 ENCOUNTER — APPOINTMENT (OUTPATIENT)
Dept: RADIOLOGY | Facility: MEDICAL CENTER | Age: 21
End: 2021-10-20
Attending: EMERGENCY MEDICINE
Payer: COMMERCIAL

## 2021-10-20 VITALS
HEIGHT: 67 IN | DIASTOLIC BLOOD PRESSURE: 76 MMHG | BODY MASS INDEX: 20.38 KG/M2 | RESPIRATION RATE: 12 BRPM | TEMPERATURE: 97.7 F | HEART RATE: 65 BPM | WEIGHT: 129.85 LBS | SYSTOLIC BLOOD PRESSURE: 126 MMHG | OXYGEN SATURATION: 100 %

## 2021-10-20 PROCEDURE — 73090 X-RAY EXAM OF FOREARM: CPT | Mod: LT

## 2021-10-20 PROCEDURE — 90715 TDAP VACCINE 7 YRS/> IM: CPT | Performed by: EMERGENCY MEDICINE

## 2021-10-20 PROCEDURE — 700111 HCHG RX REV CODE 636 W/ 250 OVERRIDE (IP): Performed by: EMERGENCY MEDICINE

## 2021-10-20 PROCEDURE — 90471 IMMUNIZATION ADMIN: CPT

## 2021-10-20 RX ORDER — AMOXICILLIN AND CLAVULANATE POTASSIUM 875; 125 MG/1; MG/1
1 TABLET, FILM COATED ORAL 2 TIMES DAILY
Qty: 10 TABLET | Refills: 0 | Status: SHIPPED | OUTPATIENT
Start: 2021-10-20 | End: 2021-10-25

## 2021-10-20 RX ADMIN — CLOSTRIDIUM TETANI TOXOID ANTIGEN (FORMALDEHYDE INACTIVATED), CORYNEBACTERIUM DIPHTHERIAE TOXOID ANTIGEN (FORMALDEHYDE INACTIVATED), BORDETELLA PERTUSSIS TOXOID ANTIGEN (GLUTARALDEHYDE INACTIVATED), BORDETELLA PERTUSSIS FILAMENTOUS HEMAGGLUTININ ANTIGEN (FORMALDEHYDE INACTIVATED), BORDETELLA PERTUSSIS PERTACTIN ANTIGEN, AND BORDETELLA PERTUSSIS FIMBRIAE 2/3 ANTIGEN 0.5 ML: 5; 2; 2.5; 5; 3; 5 INJECTION, SUSPENSION INTRAMUSCULAR at 01:05

## 2021-10-20 NOTE — ED PROVIDER NOTES
ED Provider Note    Scribed for Damon Holden M.D. by Dia Jaimes. 10/20/2021  12:13 AM    Primary care provider: Day Lima M.D.  Means of arrival: Walk in  History obtained from: Patient  History limited by: None    CHIEF COMPLAINT  Chief Complaint   Patient presents with   • Cat Bite     cat bite that has not be vaccinated          HPI  Svetlana Diza is a 21 y.o. female who presents to the Emergency Department for a cat bite and scratch on her left forearm sustained around 9 PM tonight. 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 She washed her wounds with soap and water for 2-3 minutes afterwards. The patient denies having other wounds. No other exacerbating or alleviating factors were noted. She has received the rabies vaccine.     REVIEW OF SYSTEMS  Pertinent positives include: cat bite. Pertinent negatives include: no other wounds. See history of present illness.     PAST MEDICAL HISTORY   has a past medical history of Anxiety, Anxiety, and History of kidney disease (10/10/2016).    SURGICAL HISTORY   has a past surgical history that includes abdominal exploration and other.    SOCIAL HISTORY  Social History     Tobacco Use   • Smoking status: Never Smoker   • Smokeless tobacco: Never Used   Vaping Use   • Vaping Use: Never used   Substance Use Topics   • Alcohol use: Yes     Comment: occ   • Drug use: No      Social History     Substance and Sexual Activity   Drug Use No       FAMILY HISTORY  Family History   Problem Relation Age of Onset   • Arthritis Maternal Grandmother    • Cancer Paternal Grandfather        CURRENT MEDICATIONS  Home Medications     Reviewed by Laury Gardner R.N. (Registered Nurse) on 10/19/21 at 9142  Med List Status: Partial   Medication Last Dose Status   doxycycline (VIBRAMYCIN) 100 MG Tab Not Taking Active   escitalopram (LEXAPRO) 5  "MG tablet 10/19/2021 Active   ferrous sulfate 325 (65 Fe) MG tablet 10/19/2021 Active   methylPREDNISolone (MEDROL DOSEPAK) 4 MG Tablet Therapy Pack Not Taking Active                ALLERGIES  Allergies   Allergen Reactions   • Sulfa Drugs        PHYSICAL EXAM  VITAL SIGNS: /84   Pulse 65   Temp 36.4 °C (97.6 °F) (Temporal)   Resp 16   Ht 1.702 m (5' 7\")   Wt 58.9 kg (129 lb 13.6 oz)   LMP 10/13/2021 (Exact Date)   SpO2 100%   BMI 20.34 kg/m²     Pulse ox interpretation: I interpret this pulse ox as normal.  Constitutional: Alert in no apparent distress.  HENT: Normocephalic, Atraumatic, Bilateral external ears normal. Nose normal.   Eyes: Pupils are equal and reactive. Conjunctiva normal, non-icteric.   Heart: Regular rate and rhythm, no murmurs.    Lungs: Clear to auscultation bilaterally.  Skin: Warm, Dry, No erythema, No rash.   Extremities: Scratch nikki and puncture wounds to both the dorsal and volar surface of the hand. Normal radial, median, and ulnar nerve function bilaterally. Less than 3 second capillary refill and 2+ peripheral pulses bilaterally. Normal flexion and extension.  SILT distally.   Neurologic: Alert, Grossly non-focal.   Psychiatric: Affect normal, Judgment normal, Mood normal, Appears appropriate and not intoxicated.     DIAGNOSTIC STUDIES / PROCEDURES    RADIOLOGY  DX-FOREARM LEFT   Final Result         1.  No acute traumatic bony injury.        The radiologist's interpretation of all radiological studies have been reviewed by me.    COURSE & MEDICAL DECISION MAKING  Nursing notes, VS, PMSFHx reviewed in chart.    21 y.o. female p/w chief complaint of a cat bite.    12:13 AM Patient seen and examined at bedside. Ordered DX-forearm to evaluate further. Discussed discharging if the x-ray shows no concerning findings. She was advised to return if her bite continues to worsen or she develops other symptoms such as redness or fever after 48 hours of taking antibiotics. Patient " verbalizes understanding and agreement to this plan of care.     I verified that the patient was wearing a mask and I was wearing appropriate PPE every time I entered the room. The patient's mask was on the patient at all times during my encounter except for a brief view of the oropharynx.     The differential diagnoses include but are not limited to:     #cat bite  Low concern for rabies given the cat was not feral and the patient has received the rabies vaccine  Forearm x-ray w/ no e/o retained foreign bodies.  Tetanus ordered and given.    Pt d/c with a prescription for Augmentin 125 mg.     The patient will return for new or worsening symptoms and is stable at the time of discharge.    The patient is referred to a primary physician for blood pressure management, diabetic screening, and for all other preventative health concerns.      DISPOSITION:  Patient will be discharged home in stable condition.    FOLLOW UP:  Day Lima M.D.  [facility] [place]   [address]  [facility] [address]  [phone]    In 1 week      [facility], Emergency Dept  [address]  [facility] [address] [phone]    If symptoms worsen      OUTPATIENT MEDICATIONS:  Discharge Medication List as of 10/20/2021  1:34 AM      START taking these medications    Details   amoxicillin-clavulanate (AUGMENTIN) 875-125 MG Tab Take 1 Tablet by mouth 2 times a day for 5 days., Disp-10 Tablet, R-0, Normal             FINAL IMPRESSION  1. Cat bite, initial encounter          Dia WRIGHT (Rajani), am scribing for, and in the presence of, Damon Holden M.D..    Electronically signed by: Dia Jaimes (Rajani), 10/20/2021    Damon WRIGHT M.D. personally performed the services described in this documentation, as scribed by Dia Jaimes in my presence, and it is both accurate and complete.    E    The note accurately reflects work and decisions made by me.  Damon Holden M.D.  10/20/2021  7:33 AM

## 2023-06-22 ENCOUNTER — TELEPHONE (OUTPATIENT)
Dept: SCHEDULING | Facility: IMAGING CENTER | Age: 23
End: 2023-06-22

## 2023-08-24 ENCOUNTER — APPOINTMENT (OUTPATIENT)
Dept: MEDICAL GROUP | Facility: MEDICAL CENTER | Age: 23
End: 2023-08-24
Payer: COMMERCIAL

## 2023-12-20 ENCOUNTER — OFFICE VISIT (OUTPATIENT)
Dept: MEDICAL GROUP | Facility: CLINIC | Age: 23
End: 2023-12-20
Payer: COMMERCIAL

## 2023-12-20 VITALS
OXYGEN SATURATION: 97 % | HEART RATE: 84 BPM | SYSTOLIC BLOOD PRESSURE: 126 MMHG | DIASTOLIC BLOOD PRESSURE: 81 MMHG | HEIGHT: 66 IN | BODY MASS INDEX: 23.3 KG/M2 | WEIGHT: 145 LBS

## 2023-12-20 DIAGNOSIS — Z71.84 TRAVEL ADVICE ENCOUNTER: ICD-10-CM

## 2023-12-20 PROCEDURE — 90471 IMMUNIZATION ADMIN: CPT | Performed by: FAMILY MEDICINE

## 2023-12-20 PROCEDURE — 3079F DIAST BP 80-89 MM HG: CPT | Performed by: FAMILY MEDICINE

## 2023-12-20 PROCEDURE — 90691 TYPHOID VACCINE IM: CPT | Performed by: FAMILY MEDICINE

## 2023-12-20 PROCEDURE — 90717 YELLOW FEVER VACCINE SUBQ: CPT | Performed by: FAMILY MEDICINE

## 2023-12-20 PROCEDURE — 90472 IMMUNIZATION ADMIN EACH ADD: CPT | Performed by: FAMILY MEDICINE

## 2023-12-20 PROCEDURE — 99215 OFFICE O/P EST HI 40 MIN: CPT | Mod: 25 | Performed by: FAMILY MEDICINE

## 2023-12-20 PROCEDURE — 3074F SYST BP LT 130 MM HG: CPT | Performed by: FAMILY MEDICINE

## 2023-12-21 PROBLEM — Z71.84 TRAVEL ADVICE ENCOUNTER: Status: ACTIVE | Noted: 2023-12-21

## 2023-12-21 RX ORDER — ONDANSETRON 4 MG/1
4 TABLET, ORALLY DISINTEGRATING ORAL EVERY 6 HOURS PRN
Qty: 10 TABLET | Refills: 0 | Status: SHIPPED | OUTPATIENT
Start: 2023-12-21

## 2023-12-21 RX ORDER — AZITHROMYCIN 500 MG/1
TABLET, FILM COATED ORAL
Qty: 3 TABLET | Refills: 0 | Status: SHIPPED | OUTPATIENT
Start: 2023-12-21

## 2023-12-21 RX ORDER — ATOVAQUONE AND PROGUANIL HYDROCHLORIDE 250; 100 MG/1; MG/1
TABLET, FILM COATED ORAL
Qty: 32 TABLET | Refills: 0 | Status: SHIPPED | OUTPATIENT
Start: 2023-12-21

## 2023-12-21 ASSESSMENT — ENCOUNTER SYMPTOMS
RESPIRATORY NEGATIVE: 1
GASTROINTESTINAL NEGATIVE: 1
NEUROLOGICAL NEGATIVE: 1
CARDIOVASCULAR NEGATIVE: 1
PSYCHIATRIC NEGATIVE: 1
CONSTITUTIONAL NEGATIVE: 1
EYES NEGATIVE: 1

## 2023-12-21 NOTE — PROGRESS NOTES
Subjective:   CC:   Chief Complaint   Patient presents with    Follow-Up     TRAVEL/ [place]       HPI: Svetlana is a 23 y.o. female who presents today for the following problems:    Problem   Travel Advice Encounter    Travel medicine clinic       Dates of travel are: In about 5 months.        Travel itinerary / top destinations   Patient will be traveling with her family in [facility] and in [place]      Reason for travel     Vacation    Planned activities   She will be going on safari.      Type of accommodations   Hotels and luxury  tent/safari camping.      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    Travel insurance/ rescue insurance -she has travel insurance.                  Current Outpatient Medications   Medication Sig Dispense Refill    ondansetron (ZOFRAN ODT) 4 MG TABLET DISPERSIBLE Take 1 Tablet by mouth every 6 hours as needed for Nausea/Vomiting for up to 10 doses. 10 Tablet 0    atovaquone-proguanil (MALARONE) 250-100 MG per tablet Take 1 tab po qd - start 2 days before trip, continue while in [place] and take for 7 days upon return 32 Tablet 0    azithromycin (ZITHROMAX) 500 MG tablet Take 1 tab po qd at the onset of travelers diarrhea 3 Tablet 0    escitalopram (LEXAPRO) 5 MG tablet Take 15 mg by mouth every day.      ferrous sulfate 325 (65 Fe) MG tablet Take 325 mg by mouth every day.      doxycycline (VIBRAMYCIN) 100 MG Tab Take 1 Tab by mouth 2 times a day. (Patient not taking: Reported on 10/19/2021) 14 Tab 0    methylPREDNISolone (MEDROL DOSEPAK) 4 MG Tablet Therapy Pack Follow schedule on package instructions. (Patient not taking: Reported on 10/19/2021) 21 Tab 0     No current facility-administered medications for this visit.       Social History     Tobacco Use    Smoking status: Never    Smokeless tobacco: Never   Vaping Use    Vaping Use: Never used   Substance Use Topics    Alcohol use: Yes      "Comment: occ    Drug use: No       Review of Systems   Constitutional: Negative.    HENT: Negative.     Eyes: Negative.    Respiratory: Negative.     Cardiovascular: Negative.    Gastrointestinal: Negative.    Skin: Negative.    Neurological: Negative.    Psychiatric/Behavioral: Negative.           Objective:     Vitals:    12/20/23 1452   BP: 126/81   BP Location: Left arm   Patient Position: Sitting   BP Cuff Size: Small adult   Pulse: 84   SpO2: 97%   Weight: 65.8 kg (145 lb)   Height: 1.676 m (5' 6\")     Body mass index is 23.4 kg/m².     Physical Exam  Vitals reviewed.   Constitutional:       Appearance: Normal appearance. She is normal weight.   HENT:      Head: Normocephalic and atraumatic.   Neurological:      General: No focal deficit present.      Mental Status: She is alert and oriented to person, place, and time. Mental status is at baseline.   Psychiatric:         Mood and Affect: Mood normal.         Behavior: Behavior normal.         Thought Content: Thought content normal.         Judgment: Judgment normal.          Assessment & Plan:   Travel advice encounter  Vaccines needed -she will need yellow fever and typhoid.    Vaccines given -yellow fever and typhoid.    Extensive discussion on travel risks and benefits conducted. Travax handout given - 40 minutes working with patient     Traveler's diarrhea discussed in depth ondansetron and azithromycin were prescribed    Malaria discussed in depth Malarone used for prevention was prescribed.    And permethrin use was advised.    Insurance discussed -patient has insurance.    Fu with travel clinic if there are any issues.         Followup: No follow-ups on file.    Christiano Erickson M.D.    Please note that this dictation was created using voice recognition software. I have made every reasonable attempt to correct obvious errors, but I expect that there are errors of grammar and possibly content that I did not discover before finalizing the note.  "

## 2023-12-21 NOTE — ASSESSMENT & PLAN NOTE
Vaccines needed -she will need yellow fever and typhoid.    Vaccines given -yellow fever and typhoid.    Extensive discussion on travel risks and benefits conducted. Travax handout given - 40 minutes working with patient     Traveler's diarrhea discussed in depth ondansetron and azithromycin were prescribed    Malaria discussed in depth Malarone used for prevention was prescribed.    Deet and permethrin use was advised.    Insurance discussed -patient has insurance.    Fu with travel clinic if there are any issues.